# Patient Record
Sex: FEMALE | Race: WHITE | NOT HISPANIC OR LATINO | Employment: OTHER | URBAN - METROPOLITAN AREA
[De-identification: names, ages, dates, MRNs, and addresses within clinical notes are randomized per-mention and may not be internally consistent; named-entity substitution may affect disease eponyms.]

---

## 2017-11-01 ENCOUNTER — OFFICE VISIT (OUTPATIENT)
Dept: RHEUMATOLOGY | Facility: CLINIC | Age: 62
End: 2017-11-01
Payer: COMMERCIAL

## 2017-11-01 ENCOUNTER — HOSPITAL ENCOUNTER (OUTPATIENT)
Dept: RADIOLOGY | Facility: HOSPITAL | Age: 62
Discharge: HOME | End: 2017-11-01
Attending: INTERNAL MEDICINE
Payer: COMMERCIAL

## 2017-11-01 VITALS
WEIGHT: 154 LBS | BODY MASS INDEX: 28.34 KG/M2 | SYSTOLIC BLOOD PRESSURE: 162 MMHG | HEIGHT: 62 IN | HEART RATE: 91 BPM | DIASTOLIC BLOOD PRESSURE: 80 MMHG

## 2017-11-01 DIAGNOSIS — M54.40 CHRONIC LOW BACK PAIN WITH SCIATICA, SCIATICA LATERALITY UNSPECIFIED, UNSPECIFIED BACK PAIN LATERALITY: ICD-10-CM

## 2017-11-01 DIAGNOSIS — M06.9 RHEUMATOID ARTHRITIS, INVOLVING UNSPECIFIED SITE, UNSPECIFIED RHEUMATOID FACTOR PRESENCE: ICD-10-CM

## 2017-11-01 DIAGNOSIS — M79.7 FIBROMYALGIA: ICD-10-CM

## 2017-11-01 DIAGNOSIS — G89.29 CHRONIC LOW BACK PAIN WITH SCIATICA, SCIATICA LATERALITY UNSPECIFIED, UNSPECIFIED BACK PAIN LATERALITY: ICD-10-CM

## 2017-11-01 DIAGNOSIS — M06.9 RHEUMATOID ARTHRITIS, INVOLVING UNSPECIFIED SITE, UNSPECIFIED RHEUMATOID FACTOR PRESENCE: Primary | ICD-10-CM

## 2017-11-01 DIAGNOSIS — R53.82 CHRONIC FATIGUE: ICD-10-CM

## 2017-11-01 DIAGNOSIS — R03.0 ELEVATED BLOOD PRESSURE READING: ICD-10-CM

## 2017-11-01 DIAGNOSIS — Z72.0 TOBACCO ABUSE: ICD-10-CM

## 2017-11-01 PROCEDURE — 77077 JOINT SURVEY SINGLE VIEW: CPT | Mod: 26,,, | Performed by: RADIOLOGY

## 2017-11-01 PROCEDURE — 96372 THER/PROPH/DIAG INJ SC/IM: CPT | Mod: S$GLB,,, | Performed by: INTERNAL MEDICINE

## 2017-11-01 PROCEDURE — 99999 PR PBB SHADOW E&M-NEW PATIENT-LVL V: CPT | Mod: PBBFAC,,, | Performed by: INTERNAL MEDICINE

## 2017-11-01 PROCEDURE — 99205 OFFICE O/P NEW HI 60 MIN: CPT | Mod: S$GLB,,, | Performed by: INTERNAL MEDICINE

## 2017-11-01 PROCEDURE — 77077 JOINT SURVEY SINGLE VIEW: CPT | Mod: TC

## 2017-11-01 PROCEDURE — 72100 X-RAY EXAM L-S SPINE 2/3 VWS: CPT | Mod: 26,,, | Performed by: RADIOLOGY

## 2017-11-01 PROCEDURE — 72070 X-RAY EXAM THORAC SPINE 2VWS: CPT | Mod: 26,,, | Performed by: RADIOLOGY

## 2017-11-01 PROCEDURE — 72100 X-RAY EXAM L-S SPINE 2/3 VWS: CPT | Mod: TC

## 2017-11-01 PROCEDURE — 72070 X-RAY EXAM THORAC SPINE 2VWS: CPT | Mod: TC

## 2017-11-01 RX ORDER — TRIAMCINOLONE ACETONIDE 40 MG/ML
80 INJECTION, SUSPENSION INTRA-ARTICULAR; INTRAMUSCULAR
Status: COMPLETED | OUTPATIENT
Start: 2017-11-01 | End: 2017-11-01

## 2017-11-01 RX ORDER — METHYLPREDNISOLONE ACETATE 40 MG/ML
80 INJECTION, SUSPENSION INTRA-ARTICULAR; INTRALESIONAL; INTRAMUSCULAR; SOFT TISSUE
Status: DISCONTINUED | OUTPATIENT
Start: 2017-11-01 | End: 2017-11-01

## 2017-11-01 RX ORDER — ONDANSETRON 4 MG/1
4 TABLET, FILM COATED ORAL
COMMUNITY
Start: 2017-10-20

## 2017-11-01 RX ADMIN — TRIAMCINOLONE ACETONIDE 80 MG: 40 INJECTION, SUSPENSION INTRA-ARTICULAR; INTRAMUSCULAR at 01:11

## 2017-11-01 NOTE — PROGRESS NOTES
Widespread pain index  Note the areas which the patient has had pain over the last week: 16                   Shoulder-girdle, left 1               Shoulder-girdle, right 1                         Upper arm left 1                       Upper arm right 1                         Lower arm left 1                       Lower arm right 1    Hip (buttock, trochanter) left 1  Hip (buttock, trochanter) right1                           Upper leg, left1                         Upper leg, right 1                           Lower leg, left 1                         Lower leg, right1                                     Jaw, left                                    Jaw, right1                                        Chest 1                                  Abdomen                                Upper back 1                              Lower back 1                                        Neck 1  Score will be from 0-19:                                         Symptom severity score 10  Fatigue 3  Waking Unrefreshed 2  Cognitive Symptoms 2   0 = no problem, 1=slight or mild problem 2= moderate; considerable problems often present and/or at a moderate level, 3 = severe, pervasive, continuous, life disturbing problem  For each of the 3 symptoms, indicate the level of severity over the past week using the Scale.  The symptom severity score is the sum of the severity of the 3 symptoms (fatigue, waking unrefreshed, and cognitive symptoms) plus the number of the following symptoms occurring during the previous 6 months:   Headaches 1  Pain or cramps in the lower abdomen 1  Depression 1  The final score is between 0 and 12                                          Criteria  Patient has fibromyalgia if the following 3 conditions are met:  1.  Widespread pain index greater than or equal to 7 and symptom severity score greater than or equal to 5 or widespread pain index between 3- 6, and symptom severity score greater than or equal to 9.    2.   Symptoms have been present in a similar level for at least 3 months  3.  The patient does not have a disorder that would otherwise sufficiently explain the pain

## 2017-11-01 NOTE — PROGRESS NOTES
Subjective:       Patient ID: Sarika Sanchez is a 62 y.o. female.    Chief Complaint: Disease Management    HPI   62YF with PMH RA ( dx 3yrs ago), Fibromyalgia, HTN, COPD, B12 def, Migraine, Seizures, Tobacco abuse, T5 vertebra fracture ( training as a ), GERD, ?Barretts esophagus self referred for management of RA. Pt was accompanied by  who was also providing some history.     Pt reports diagnosis of RA ~3yrs ago by PCP in [place] ( Dr Oliva) who started her on prednisone and MTX (20mg weekly) which she took for about 1yr and 6 months respectively until she was seen by a rheumatologist when she moved to [place] due to 's work. Pt was seen by Dr Merlin Wilson 1.5yrs ago who weaned her off the prednisone and started her on Humira since the MTX was not working. Pt reports significant relief of joint pains after starting Humira and she took for about 3 months but discontinued taking it due to concern for malignancy. Pt reports she was doing well ~8months after she stopped taking Humira. Pt reported worsening of joint pains (hands, knees,back) with AM stiffness that lasts for 3 hours, 10/10, associated with swelling, with difficulty gripping/holding things,  with no alleviating or exacerbating factors. Pt reports trial of alleve, ibuprofen, naproxen with no relief.      Previous meds  MTX  Humira worked great but stopped because of concern of malignancy due to strong family hx   Gabapentin :disorientation    Pt reports fatigue, chills, SOB, oral ulcers, dry eyes, tingling/burning, color changes on fingers, non productive cough, headaches.     Pt denies weight changes, nasal/genital ulcers, fever, n/v, abd pain, CP,  dysphagia, hemoptysis, changes in bowel/bladder habits, pleurisy, pericarditis, jaw claudication, scalp tenderness, vision changes ,tight skin, thromoboses, photosensitivity, skin rash, raynauds, alopecia.     Reports hx of miscarriage X1 in 1st trimester. Had full term  "births since although reports giving birth to baby who  from asphixation    family history of autoimmune disease: RA (mother and grandmother)  Sister: breast CA   Sister recently diagnosed with ovarian CA  Brother : GI cancer        Review of Systems   Constitutional: Positive for chills and fatigue. Negative for fever.   HENT: Positive for dental problem, hearing loss and mouth sores. Negative for sore throat and trouble swallowing.    Eyes: Negative for pain, redness and visual disturbance.   Respiratory: Positive for cough and shortness of breath. Negative for chest tightness.    Cardiovascular: Negative for chest pain, palpitations and leg swelling.   Endocrine: Negative for polyuria.   Genitourinary: Negative for difficulty urinating, dysuria, frequency, genital sores and urgency.   Musculoskeletal: Negative for gait problem.   Skin: Positive for color change. Negative for rash.   Allergic/Immunologic: Negative for immunocompromised state.   Neurological: Positive for weakness, numbness and headaches. Negative for seizures.   Hematological: Bruises/bleeds easily.   Psychiatric/Behavioral: Positive for decreased concentration, dysphoric mood and sleep disturbance.         Objective:   BP (!) 162/80 (BP Location: Left arm, Patient Position: Sitting, BP Method: Large (Automatic))   Pulse 91   Ht 5' 2" (1.575 m)   Wt 69.9 kg (154 lb)   BMI 28.17 kg/m²         Physical Exam   Constitutional: She is oriented to person, place, and time and well-developed, well-nourished, and in no distress.   HENT:   Head: Normocephalic and atraumatic.   Eyes: EOM are normal. Pupils are equal, round, and reactive to light.   Neck: Normal range of motion. Neck supple.   Cardiovascular: Normal rate and regular rhythm.    Pulmonary/Chest: Effort normal. No respiratory distress. She has no rales. She exhibits no tenderness.   Abdominal: Soft. Bowel sounds are normal. She exhibits no distension. There is no tenderness. "       Right Side Rheumatological Exam     Examination finds the shoulder normal.    The patient is tender to palpation of the elbow, wrist, knee, 1st PIP, 1st MCP, 2nd PIP, 2nd MCP, 3rd PIP, 3rd MCP, 4th PIP, 4th MCP, 5th PIP and 5th MCP    She has swelling of the wrist, 2nd MCP, 3rd MCP and 4th MCP    Knee Exam   Patellofemoral Crepitus: positive    Hip Exam   Tenderness Location: no tenderness    Elbow/Wrist Exam     Wrist Warmth: positive  Wrist Swelling: positive    Muscle Strength (0-5 scale):  Neck Flexion:  4  Neck Extension: 5  Deltoid:  3  Biceps: 3/5   Triceps:  3  : 4/5   Iliopsoas: 4  Quadriceps:  4   Distal Lower Extremity: 4    Left Side Rheumatological Exam     Examination finds the shoulder and elbow normal.    The patient is tender to palpation of the wrist, knee, 1st PIP, 1st MCP, 2nd PIP, 2nd MCP, 3rd PIP, 3rd MCP, 4th PIP, 4th MCP, 5th PIP and 5th MCP.    She has swelling of the wrist, 1st MCP, 2nd MCP, 3rd MCP and 4th MCP    Knee Exam     Patellofemoral Crepitus: positive    Hip Exam   Tenderness Location: no tenderness    Elbow/Wrist Exam   Wrist Swelling: positive    Muscle Strength (0-5 scale):  Neck Flexion:  4  Neck Extension: 5  Deltoid:  3  Biceps: 3/5   Triceps:  3  :  4/5   Iliopsoas: 4  Quadriceps:  4   Distal Lower Extremity: 4      Neurological: She is alert and oriented to person, place, and time.   Reflex Scores:       Bicep reflexes are 2+ on the right side and 2+ on the left side.       Brachioradialis reflexes are 2+ on the right side and 2+ on the left side.       Patellar reflexes are 3+ on the right side and 3+ on the left side.       Achilles reflexes are 2+ on the right side and 2+ on the left side.  Skin: Skin is warm and dry. No rash noted.     Psychiatric: Affect normal.   Musculoskeletal: She exhibits edema and tenderness.   Difficulty assessing ROM due to pain.            Current Outpatient Prescriptions on File Prior to Visit   Medication Sig Dispense Refill     ibuprofen (ADVIL,MOTRIN) 800 MG tablet TAKE 1 TABLET (800 MG TOTAL) BY MOUTH EVERY 6 (SIX) HOURS AS NEEDED. 90 tablet 3    carvedilol (COREG) 6.25 MG tablet Take 3.125 mg by mouth 2 (two) times daily with meals.      cranberry fruit (CRANBERRY) 475 mg Cap Take by mouth.      CYANOCOBALAMIN/COBAMAMIDE (B12 SL) Place under the tongue.      divalproex (DEPAKOTE) 500 MG TbEC Take 1 tablet (500 mg total) by mouth every 8 (eight) hours. 270 tablet 3    folic acid (FOLVITE) 1 MG tablet Take 1 tablet (1 mg total) by mouth once daily. 90 tablet 3    MECLIZINE HCL (MECLIZINE ORAL) Take by mouth.      omeprazole (PRILOSEC) 40 MG capsule Take 1 capsule (40 mg total) by mouth once daily. 90 capsule 3    ondansetron (ZOFRAN-ODT) 4 MG TbDL Take 8 mg by mouth every 8 (eight) hours.      PYRIDOXINE HCL (VITAMIN B-6 ORAL) Take by mouth.      [DISCONTINUED] ADALIMUMAB (HUMIRA PEN SUBQ) Inject into the skin every 14 (fourteen) days.      [DISCONTINUED] CELECOXIB (CELEBREX ORAL) Take by mouth.      [DISCONTINUED] methotrexate 5 MG tablet Take 20 mg by mouth once a week.        No current facility-administered medications on file prior to visit.        Review of patient's allergies indicates:   Allergen Reactions    Lipitor [atorvastatin] Anaphylaxis    Adhesive tape-silicones Dermatitis    Codeine      itching     Results for CHARLI PAUL (MRN [id number]) as of 11/1/2017 15:23   Ref. Range 12/9/2013 10:14   WBC Latest Ref Range: 3.90 - 12.70 K/uL 10.43   RBC Latest Ref Range: 4.00 - 5.40 M/uL 4.86   Hemoglobin Latest Ref Range: 12.0 - 16.0 g/dL 15.5   Hematocrit Latest Ref Range: 37.0 - 48.5 % 45.4   MCV Latest Ref Range: 82 - 98 fL 93   MCH Latest Ref Range: 27.0 - 31.0 pg 31.9 (H)   MCHC Latest Ref Range: 32.0 - 36.0 % 34.1   RDW Latest Ref Range: 11.5 - 14.5 % 13.6   Platelets Latest Ref Range: 150 - 350 K/uL 271   MPV Latest Ref Range: 9.2 - 12.9 fL 11.0   Gran% Latest Ref Range: 38.0 - 73.0 % 48.0   Gran #  Latest Ref Range: 1.8 - 7.7 K/uL 5.0   Lymph% Latest Ref Range: 18.0 - 48.0 % 38.4   Lymph # Latest Ref Range: 1.0 - 4.8 K/uL 4.0   Mono% Latest Ref Range: 4.0 - 15.0 % 8.2   Mono # Latest Ref Range: 0.3 - 1.0 K/uL 0.9   Eosinophil% Latest Ref Range: 0.0 - 8.0 % 4.4   Eos # Latest Ref Range: 0.0 - 0.5 K/uL 0.5   Basophil% Latest Ref Range: 0.0 - 1.9 % 0.7   Baso # Latest Ref Range: 0.00 - 0.20 K/uL 0.07   Results for CHARLI PAUL (MRN [id number]) as of 11/1/2017 15:23   Ref. Range 12/9/2013 10:14   Sodium Latest Ref Range: 136 - 145 mmol/L 141   Potassium Latest Ref Range: 3.5 - 5.1 mmol/L 4.2   Chloride Latest Ref Range: 95 - 110 mmol/L 104   CO2 Latest Ref Range: 23 - 29 mmol/L 28   Anion Gap Latest Ref Range: 8 - 16 mmol/L 9   BUN, Bld Latest Ref Range: 6 - 20 mg/dL 10   Creatinine Latest Ref Range: 0.5 - 1.4 mg/dL 0.8   eGFR if non African American Latest Ref Range: >60 mL/min/1.73 m^2 >60.0   eGFR if African American Latest Ref Range: >60 mL/min/1.73 m^2 >60.0   Glucose Latest Ref Range: 70 - 110 mg/dL 83   Calcium Latest Ref Range: 8.7 - 10.5 mg/dL 9.8   Alkaline Phosphatase Latest Ref Range: 55 - 135 U/L 94   Total Protein Latest Ref Range: 6.0 - 8.4 g/dL 6.9   Albumin Latest Ref Range: 3.5 - 5.2 g/dL 3.5   Total Bilirubin Latest Ref Range: 0.1 - 1.0 mg/dL 0.9   AST Latest Ref Range: 10 - 40 U/L 20   ALT Latest Ref Range: 10 - 44 U/L 20      Widespread pain index  Note the areas which the patient has had pain over the last week: 16                   Shoulder-girdle, left 1               Shoulder-girdle, right 1                         Upper arm left 1                       Upper arm right 1                         Lower arm left 1                       Lower arm right 1    Hip (buttock, trochanter) left 1  Hip (buttock, trochanter) right1                           Upper leg, left1                         Upper leg, right 1                           Lower leg, left 1                         Lower leg,  right1                                     Jaw, left                                    Jaw, right1                                        Chest 1                                  Abdomen                                Upper back 1                              Lower back 1                                        Neck 1  Score will be from 0-19:                                         Symptom severity score 10  Fatigue 3  Waking Unrefreshed 2  Cognitive Symptoms 2   0 = no problem, 1=slight or mild problem 2= moderate; considerable problems often present and/or at a moderate level, 3 = severe, pervasive, continuous, life disturbing problem  For each of the 3 symptoms, indicate the level of severity over the past week using the Scale.  The symptom severity score is the sum of the severity of the 3 symptoms (fatigue, waking unrefreshed, and cognitive symptoms) plus the number of the following symptoms occurring during the previous 6 months:   Headaches 1  Pain or cramps in the lower abdomen 1  Depression 1  The final score is between 0 and 12                                          Criteria  Patient has fibromyalgia if the following 3 conditions are met:  1.  Widespread pain index greater than or equal to 7 and symptom severity score greater than or equal to 5 or widespread pain index between 3- 6, and symptom severity score greater than or equal to 9.    2.  Symptoms have been present in a similar level for at least 3 months  3.  The patient does not have a disorder that would otherwise sufficiently explain the pain        Assessment:       1. Rheumatoid arthritis, involving unspecified site, unspecified rheumatoid factor presence    2. Fibromyalgia    3. Chronic fatigue    4. Tobacco abuse    5. Chronic low back pain with sciatica, sciatica laterality unspecified, unspecified back pain laterality    6. Elevated blood pressure reading            Plan:     Sarika was seen today for disease management.    Diagnoses  and all orders for this visit:    Rheumatoid arthritis, involving unspecified site, unspecified rheumatoid factor presence  62YF with PMH RA ( dx 3yrs ago), Fibromyalgia, HTN, COPD, B12 def, Migraine, Seizures, Tobacco abuse, T5 vertebra fracture ( training as a ), GERD, ?Barretts esophagus self referred for management of RA. Pt with poor response to MTX and great response to Humira which she took for 3months however discontinued due to pts concern for malignancy. Unknown autoimmune serologies. Recent labs done @ outside facility 04/17 shows WBC 11.2, elevated H/H 14/44, plts 277. Cr 0.6, LFts ok. Physical exam shows synovitis, tender points, difficulty with ROM due to pain.  Will order IM steroid for some pain relief  Will obtain labs to assess disease activity as well pre-dmard labs  Will obtain imaging to evaluate for erosive changes.   Pending labs and outside records from Dr Cerda, will decide therapy to initiate.   -     CBC auto differential; Future  -     Sedimentation rate, manual; Future  -     Comprehensive metabolic panel; Future  -     Protein / creatinine ratio, urine; Future  -     C-reactive protein; Future  -     RICHARDSON; Future  -     Rheumatoid factor; Future  -     Cyclic citrul peptide antibody, IgG; Future  -     HIV-1 and HIV-2 antibodies; Future  -     Hepatitis B surface antigen; Future  -     HBcAB; Future  -     Hepatitis B surface antibody; Future  -     Hepatitis C antibody; Future  -     Quantiferon Gold TB; Future  -     RPR; Future  -     DXA Bone Density Spine And Hip; Future  -     Ambulatory referral to Infectious Disease  -     Vitamin D; Future  -     X-ray Arthritis Survey; Future  -     Urinalysis; Future    Fibromyalgia  WPI 16 SS 10  Pt did not tolerate gabapentin due to side effects. Currently on flexeril.   Consider starting Cymbalta on next clinic visit    Chronic fatigue  [place] 2/2 underlying disease process and fibromyalagia  Obtain TSH    Tobacco abuse  Counseled on  cessation    Chronic low back pain with sciatica, sciatica laterality unspecified, unspecified back pain laterality  -     X-Ray Lumbar Spine AP And Lateral; Future  -     X-Ray Thoracic Spine AP Lateral; Future    Elevated blood pressure reading  [place] 2/2 joints pain.   F/u PCP    Other orders  -     triamcinolone acetonide injection 80 mg; Inject 2 mLs (80 mg total) into the muscle one time.    RTC in 4weeks

## 2017-11-02 ENCOUNTER — TELEPHONE (OUTPATIENT)
Dept: RHEUMATOLOGY | Facility: CLINIC | Age: 62
End: 2017-11-02

## 2017-11-02 NOTE — TELEPHONE ENCOUNTER
Attempted to call pt but no response. Left VM.    CBC shows elevated h/h suggestive of smoking, thrombocytosis likley related to inflammatory process. Metabolic profile showed normal renal and liver function. Sed rate was minimally elevated and crp was normal. Negative RICHARDSON, HIV, hep panel and RPR.   Elevated RF and anti CCP which goes with her hx of RA. Some other tests still pending.     Xray survey shows narrowing of joints in the cervical region, as well knees with no evidence of erosions. Xray L spine shows compression deformity and Xray T-spine shows no evidence of fractures and minimal joint space narrowing.

## 2017-11-02 NOTE — PROGRESS NOTES
I have personally taken the history and examined the patient to verify the fellow's notation, and I agree with the impression and plan stated.   She has RA and still smokes and also has fibromyalgia and probably OA spine.  Workup initiated.

## 2017-12-13 ENCOUNTER — OFFICE VISIT (OUTPATIENT)
Dept: RHEUMATOLOGY | Facility: CLINIC | Age: 62
End: 2017-12-13
Payer: COMMERCIAL

## 2017-12-13 ENCOUNTER — HOSPITAL ENCOUNTER (OUTPATIENT)
Dept: RADIOLOGY | Facility: CLINIC | Age: 62
Discharge: HOME | End: 2017-12-13
Attending: INTERNAL MEDICINE
Payer: COMMERCIAL

## 2017-12-13 VITALS
WEIGHT: 158.81 LBS | HEART RATE: 69 BPM | DIASTOLIC BLOOD PRESSURE: 81 MMHG | SYSTOLIC BLOOD PRESSURE: 154 MMHG | RESPIRATION RATE: 20 BRPM | BODY MASS INDEX: 29.04 KG/M2

## 2017-12-13 DIAGNOSIS — R03.0 ELEVATED BLOOD PRESSURE READING: ICD-10-CM

## 2017-12-13 DIAGNOSIS — M06.9 RHEUMATOID ARTHRITIS, INVOLVING UNSPECIFIED SITE, UNSPECIFIED RHEUMATOID FACTOR PRESENCE: ICD-10-CM

## 2017-12-13 DIAGNOSIS — F17.200 TOBACCO USE DISORDER: ICD-10-CM

## 2017-12-13 DIAGNOSIS — M05.79 RHEUMATOID ARTHRITIS INVOLVING MULTIPLE SITES WITH POSITIVE RHEUMATOID FACTOR: Primary | ICD-10-CM

## 2017-12-13 DIAGNOSIS — R53.82 CHRONIC FATIGUE: ICD-10-CM

## 2017-12-13 DIAGNOSIS — E55.9 VITAMIN D INSUFFICIENCY: ICD-10-CM

## 2017-12-13 PROCEDURE — 77080 DXA BONE DENSITY AXIAL: CPT | Mod: TC

## 2017-12-13 PROCEDURE — 77080 DXA BONE DENSITY AXIAL: CPT | Mod: 26,,, | Performed by: INTERNAL MEDICINE

## 2017-12-13 PROCEDURE — 99214 OFFICE O/P EST MOD 30 MIN: CPT | Mod: S$GLB,,, | Performed by: INTERNAL MEDICINE

## 2017-12-13 PROCEDURE — 99999 PR PBB SHADOW E&M-EST. PATIENT-LVL III: CPT | Mod: PBBFAC,,, | Performed by: INTERNAL MEDICINE

## 2017-12-13 PROCEDURE — 96372 THER/PROPH/DIAG INJ SC/IM: CPT | Mod: S$GLB,,, | Performed by: INTERNAL MEDICINE

## 2017-12-13 RX ORDER — TRIAMCINOLONE ACETONIDE 40 MG/ML
80 INJECTION, SUSPENSION INTRA-ARTICULAR; INTRAMUSCULAR
Status: COMPLETED | OUTPATIENT
Start: 2017-12-13 | End: 2017-12-13

## 2017-12-13 RX ADMIN — TRIAMCINOLONE ACETONIDE 80 MG: 40 INJECTION, SUSPENSION INTRA-ARTICULAR; INTRAMUSCULAR at 01:12

## 2017-12-13 ASSESSMENT — ROUTINE ASSESSMENT OF PATIENT INDEX DATA (RAPID3)
PSYCHOLOGICAL DISTRESS SCORE: 5.5
FATIGUE SCORE: 7.5
AM STIFFNESS SCORE: 1, YES
MDHAQ FUNCTION SCORE: 1.4
PATIENT GLOBAL ASSESSMENT SCORE: 5.5
WHEN YOU AWAKENED IN THE MORNING OVER THE LAST WEEK, PLEASE INDICATE THE AMOUNT OF TIME IT TAKES UNTIL YOU ARE AS LIMBER AS YOU WILL BE FOR THE DAY: 45 MIN
PAIN SCORE: 7
TOTAL RAPID3 SCORE: 5.72

## 2017-12-13 NOTE — PROGRESS NOTES
Subjective:       Patient ID: Sarika Sanchez is a 62 y.o. female.    Chief Complaint: Disease Management      62YF with PMH seropositive RA ( dx 3yrs ago) RF,ACPA), Fibromyalgia, here for f/u.  Since last visit 11/01/17, pt reports improvement in joint pains, ability to  things with Kenalog injection. AM stiffness reduced to 45mins compared to 3 hrs. She reports that the pain (hands) slowly restarted back about 1 week ago which she is currently taking naproxen + flexeril for pain which she states provides relief. Pt states that her family is going to be relocating to [place] 5th Jan 2018.   Pt denies fever, chills, CP, SOB, abd pain, changes in bowel/bladder habits.             Associated symptoms include fatigue and headaches. Pertinent negatives include no dysuria, fever or trouble swallowing.        Initial hx  62YF with PMH RA ( dx 3yrs ago), Fibromyalgia, HTN, COPD, B12 def, Migraine, Seizures, Tobacco abuse, T5 vertebra fracture ( training as a ), GERD, ?Barretts esophagus self referred for management of RA. Pt was accompanied by  who was also providing some history.     Pt reports diagnosis of RA ~3yrs ago by PCP in [place] ( Dr Oliva) who started her on prednisone and MTX (20mg weekly) which she took for about 1yr and 6 months respectively until she was seen by a rheumatologist when she moved to [place] due to 's work. Pt was seen by Dr Merlin Wilson 1.5yrs ago who weaned her off the prednisone and started her on Humira since the MTX was not working. Pt reports significant relief of joint pains after starting Humira and she took for about 3 months but discontinued taking it due to concern for malignancy. Pt reports she was doing well ~8months after she stopped taking Humira. Pt reported worsening of joint pains (hands, knees,back) with AM stiffness that lasts for 3 hours, 10/10, associated with swelling, with difficulty gripping/holding things,  with no alleviating or  exacerbating factors. Pt reports trial of alleve, ibuprofen, naproxen with no relief.      Previous meds  MTX failed  Humira worked great but stopped because of concern of malignancy due to strong family hx   Gabapentin :disorientation    Pt reports fatigue, chills, SOB, oral ulcers, dry eyes, tingling/burning, color changes on fingers, non productive cough, headaches.     Pt denies weight changes, nasal/genital ulcers, fever, n/v, abd pain, CP,  dysphagia, hemoptysis, changes in bowel/bladder habits, pleurisy, pericarditis, jaw claudication, scalp tenderness, vision changes ,tight skin, thromoboses, photosensitivity, skin rash, raynauds, alopecia.     Reports hx of miscarriage X1 in 1st trimester. Had full term births since although reports giving birth to baby who  from asphixation    family history of autoimmune disease: RA (mother and grandmother)  Sister: breast CA   Sister recently diagnosed with ovarian CA  Brother : GI cancer        Review of Systems   Constitutional: Positive for fatigue. Negative for chills and fever.   HENT: Positive for dental problem. Negative for hearing loss, mouth sores, sore throat and trouble swallowing.    Eyes: Negative for pain, redness and visual disturbance.   Respiratory: Negative for cough, chest tightness and shortness of breath.    Cardiovascular: Negative for chest pain, palpitations and leg swelling.   Gastrointestinal: Positive for constipation. Negative for abdominal pain, nausea and vomiting.   Endocrine: Negative for polyuria.   Genitourinary: Negative for difficulty urinating, dysuria, frequency, genital sores and urgency.   Musculoskeletal: Negative for gait problem.   Skin: Positive for color change. Negative for rash.   Allergic/Immunologic: Negative for immunocompromised state.   Neurological: Positive for weakness, numbness and headaches. Negative for seizures.   Hematological: Bruises/bleeds easily.   Psychiatric/Behavioral: Positive for decreased  concentration, dysphoric mood and sleep disturbance.         Objective:   BP (!) 154/81   Pulse 69   Resp 20   Wt 72 kg (158 lb 12.8 oz)   BMI 29.04 kg/m²         Physical Exam   Constitutional: She is oriented to person, place, and time and well-developed, well-nourished, and in no distress.   HENT:   Head: Normocephalic and atraumatic.   Eyes: EOM are normal. Pupils are equal, round, and reactive to light.   Neck: Normal range of motion. Neck supple.   Cardiovascular: Normal rate and regular rhythm.    Pulmonary/Chest: Effort normal. No respiratory distress. She has no rales. She exhibits no tenderness.   Abdominal: Soft. Bowel sounds are normal. She exhibits no distension. There is no tenderness.       Right Side Rheumatological Exam     Knee Exam   Patellofemoral Crepitus: positive    Hip Exam   Tenderness Location: no tenderness    Left Side Rheumatological Exam     Knee Exam     Patellofemoral Crepitus: positive    Hip Exam   Tenderness Location: no tenderness      Neurological: She is alert and oriented to person, place, and time.   Skin: Skin is warm and dry. No rash noted.     Psychiatric: Affect normal.   Musculoskeletal: She exhibits edema and tenderness.   Synovitis on wrists b/l       Physical Exam     Tenderness:   Right hand: 2nd PIP, 3rd PIP, 4th PIP and 4th DIP  Left hand: 4th MCP, 5th MCP, 4th PIP and 5th PIP    Swelling:   RUE: wrist  LUE: wrist    FRAUSTO-28 tender joint count: 7  FRAUSTO-28 swollen joint count: 2  FRAUSTO-28 score: 1.88 (Remission)      Current Outpatient Prescriptions on File Prior to Visit   Medication Sig Dispense Refill    ibuprofen (ADVIL,MOTRIN) 800 MG tablet TAKE 1 TABLET (800 MG TOTAL) BY MOUTH EVERY 6 (SIX) HOURS AS NEEDED. 90 tablet 3    omeprazole (PRILOSEC) 40 MG capsule Take 1 capsule (40 mg total) by mouth once daily. 90 capsule 3    ondansetron (ZOFRAN) 4 MG tablet 4 mg.      cranberry fruit (CRANBERRY) 475 mg Cap Take by mouth.      CYANOCOBALAMIN/COBAMAMIDE (B12  SL) Place under the tongue.      divalproex (DEPAKOTE) 500 MG TbEC Take 1 tablet (500 mg total) by mouth every 8 (eight) hours. 270 tablet 3    folic acid (FOLVITE) 1 MG tablet Take 1 tablet (1 mg total) by mouth once daily. 90 tablet 3    MECLIZINE HCL (MECLIZINE ORAL) Take by mouth.      PYRIDOXINE HCL (VITAMIN B-6 ORAL) Take by mouth.      [DISCONTINUED] carvedilol (COREG) 6.25 MG tablet Take 3.125 mg by mouth 2 (two) times daily with meals.      [DISCONTINUED] ondansetron (ZOFRAN-ODT) 4 MG TbDL Take 8 mg by mouth every 8 (eight) hours.       No current facility-administered medications on file prior to visit.        Review of patient's allergies indicates:   Allergen Reactions    Lipitor [atorvastatin] Anaphylaxis    Adhesive tape-silicones Dermatitis    Codeine      itching   Results for CHARLI PAUL (MRN [id number]) as of 12/13/2017 16:49   Ref. Range 11/1/2017 14:25   WBC Latest Ref Range: 3.90 - 12.70 K/uL 12.04   RBC Latest Ref Range: 4.00 - 5.40 M/uL 5.78 (H)   Hemoglobin Latest Ref Range: 12.0 - 16.0 g/dL 16.5 (H)   Hematocrit Latest Ref Range: 37.0 - 48.5 % 48.9 (H)   MCV Latest Ref Range: 82 - 98 fL 85   MCH Latest Ref Range: 27.0 - 31.0 pg 28.5   MCHC Latest Ref Range: 32.0 - 36.0 g/dL 33.7   RDW Latest Ref Range: 11.5 - 14.5 % 12.7   Platelets Latest Ref Range: 150 - 350 K/uL 412 (H)   MPV Latest Ref Range: 9.2 - 12.9 fL 10.1   Gran% Latest Ref Range: 38.0 - 73.0 % 64.6   Gran # Latest Ref Range: 1.8 - 7.7 K/uL 7.8 (H)   Immature Granulocytes Latest Ref Range: 0.0 - 0.5 % 0.4   Immature Grans (Abs) Latest Ref Range: 0.00 - 0.04 K/uL 0.05 (H)   Lymph% Latest Ref Range: 18.0 - 48.0 % 24.1   Lymph # Latest Ref Range: 1.0 - 4.8 K/uL 2.9   Mono% Latest Ref Range: 4.0 - 15.0 % 6.3   Mono # Latest Ref Range: 0.3 - 1.0 K/uL 0.8   Eosinophil% Latest Ref Range: 0.0 - 8.0 % 3.5   Eos # Latest Ref Range: 0.0 - 0.5 K/uL 0.4   Basophil% Latest Ref Range: 0.0 - 1.9 % 1.1   Baso # Latest Ref Range: 0.00  - 0.20 K/uL 0.13   nRBC Latest Ref Range: 0 /100 WBC 0   Results for CHARLI PAUL (MRN [id number]) as of 12/13/2017 16:49   Ref. Range 11/1/2017 14:25   Sodium Latest Ref Range: 136 - 145 mmol/L 141   Potassium Latest Ref Range: 3.5 - 5.1 mmol/L 3.8   Chloride Latest Ref Range: 95 - 110 mmol/L 109   CO2 Latest Ref Range: 23 - 29 mmol/L 20 (L)   Anion Gap Latest Ref Range: 8 - 16 mmol/L 12   BUN, Bld Latest Ref Range: 6 - 20 mg/dL 10   Creatinine Latest Ref Range: 0.5 - 1.4 mg/dL 0.7   eGFR if non African American Latest Ref Range: >60 mL/min/1.73 m^2 >60.0   eGFR if African American Latest Ref Range: >60 mL/min/1.73 m^2 >60.0   Glucose Latest Ref Range: 70 - 110 mg/dL 96   Calcium Latest Ref Range: 8.7 - 10.5 mg/dL 9.9   Alkaline Phosphatase Latest Ref Range: 55 - 135 U/L 119   Total Protein Latest Ref Range: 6.0 - 8.4 g/dL 7.9   Albumin Latest Ref Range: 3.5 - 5.2 g/dL 3.5   Total Bilirubin Latest Ref Range: 0.1 - 1.0 mg/dL 0.7   AST Latest Ref Range: 10 - 40 U/L 16   ALT Latest Ref Range: 10 - 44 U/L 15   Results for CHARLI PAUL (MRN [id number]) as of 12/13/2017 16:49   Ref. Range 11/1/2017 14:25   CCP Antibodies Latest Ref Range: <5.0 U/mL 10.5 (H)   Rheumatoid Factor Latest Ref Range: 0.0 - 15.0 IU/mL 221.0 (H)   Results for CHARLI PAUL (MRN [id number]) as of 12/13/2017 16:49   Ref. Range 11/1/2017 14:25   Hep B Core Total Ab Unknown Negative   Hep B S Ab Unknown Negative   Hepatitis B Surface Ag Unknown Negative   Hepatitis C Ab Unknown Negative   Mitogen - Nil Latest Ref Range: See text IU/mL 9.808   NIL Latest Ref Range: See text IU/mL 0.067   TB Antigen Latest Ref Range: See text IU/mL 0.079   TB Antigen - Nil Latest Ref Range: See Text IU/mL 0.012   TB Gold Unknown Negative   HIV 1/2 Ag/Ab Latest Ref Range: Negative  Negative   RPR Latest Ref Range: Non-reactive  Non-reactive   Results for CHARLI PAUL (MRN [id number]) as of 12/13/2017 16:49   Ref. Range 11/1/2017 13:58   Specimen UA  Unknown Urine, Unspecified   Color, UA Latest Ref Range: Yellow, Straw, Anais  Yellow   pH, UA Latest Ref Range: 5.0 - 8.0  5.0   Specific Gravity, UA Latest Ref Range: 1.005 - 1.030  1.010   Appearance, UA Latest Ref Range: Clear  Hazy (A)   Protein, UA Latest Ref Range: Negative  Negative   Glucose, UA Latest Ref Range: Negative  Negative   Ketones, UA Latest Ref Range: Negative  Negative   Occult Blood UA Latest Ref Range: Negative  1+ (A)   Nitrite, UA Latest Ref Range: Negative  Negative   Urobilinogen, UA Latest Ref Range: <2.0 EU/dL Negative   Bilirubin (UA) Latest Ref Range: Negative  Negative   Leukocytes, UA Latest Ref Range: Negative  1+ (A)   RBC, UA Latest Ref Range: 0 - 4 /hpf 3   WBC, UA Latest Ref Range: 0 - 5 /hpf 8 (H)   Bacteria, UA Latest Ref Range: None-Occ /hpf Rare   Squam Epithel, UA Latest Units: /hpf 15   Microscopic Comment Unknown SEE COMMENT   Prot/Creat Ratio, Ur Latest Ref Range: 0.00 - 0.20  Unable to calculate   Protein, Urine Random Latest Ref Range: 0 - 15 mg/dL <7         Xray T spine 11/17  Mild DJD and osteopenia.  No fracture or bone destruction identified    Xray L spine 11/17  Mild compression deformity involving the L2 vertebral body identified a slight loss of height anteriorly.  The compression mostly affecting the superior endplate.  No other fractures or dislocation.  DJD.  The disc spaces are narrowed between L4 and S1 vertebral segments.  No spondylolisthesis or bone destruction.    Xray arthritis survey 11/17  Lateral view of the cervical spine show no atlantoaxial subluxation.  DJD.  The disc spaces are narrowed between C3 and C7 vertebral segments.  AP view of the hands and feet shows mild DJD and osteopenia.  Hallux valgus deformity bilaterally.  No definite bone erosions.  AP view of the knees shows a slight narrowing of the joint spaces bilaterally.  Minimal calcification in the meniscus suggest chondrocalcinosis.  No fracture or bone destruction  identified        Assessment:       1. Rheumatoid arthritis involving multiple sites with positive rheumatoid factor    2. Tobacco use disorder    3. Elevated blood pressure reading    4. Chronic fatigue            Plan:           Sarika was seen today for disease management.    Diagnoses and all orders for this visit:    Rheumatoid arthritis involving multiple sites with positive rheumatoid factor  62YF with PMH RA ( dx 3yrs ago), Fibromyalgia, HTN, COPD, B12 def, Migraine, Seizures, Tobacco abuse, T5 vertebra fracture ( training as a ), GERD, ?Barretts esophagus here for management of RA. Pt with poor response to MTX and great response to Humira which she took for 3months however discontinued due to pts concern for malignancy due to her fam hx. Labs show + RF,ACPA. RICHARDSON neg. CBC shows  Elevated H/H 16.5/48.9, plts 412. Hep B,C, HIV,RPR and Quantiferon gold TB neg.  Arthritis survey shows no evidence of erosions, knees show chondrocalcinosis.  Plan was to initiate therapy for RA but pt is going to be moving to [facility] in Jan 5th 2018. Pt given rheumatologist information in that area to setup followup. Due to nature of therapies for RA which require close monitoring of blood work do not feel comfortable prescribing biologics or other DMARDs  where we would be unable to monitor her closely. It would be more beneficial for the patient to setup an appointment with a closer rheumatologist and we can send her records to them. Pt is not currently taking anything for her blood pressure and has elevated BP this visit which would make short term use of oral prednisone not a good option.  Will repeat kenalog inj X1 which seemed to have given much relief since last visit 11/01/17.     Tobacco use disorder  Counseled on cessation    Elevated blood pressure reading  Pt advised to f/u PCP, currently not on any meds    Fibromyalgia  WPI 16 SS 10  Pt did not tolerate gabapentin due to side effects. Currently on  flexeril.     Chronic fatigue  Likley 2/2 underlying disease process and fibromyalgia      Chronic low back pain with sciatica, sciatica laterality unspecified, unspecified back pain laterality  Mild compression deformity involving the L2 vertebral body identified a slight loss of height anteriorly on Xray L spine. Will f/u DEXA     Other orders  -     triamcinolone acetonide injection 80 mg; Inject 2 mLs (80 mg total) into the muscle one time.  .    RTC prn

## 2017-12-14 NOTE — PROGRESS NOTES
I have personally taken the history and examined the patient to verify the fellow's notation, and I agree with the impression and plan stated.   She has RA and needs DMARD Rx but is moving to [place] so we will give a dose of IM Kenalog for now with expectation that she will seek a new rheumatologist in [facility].  WD